# Patient Record
(demographics unavailable — no encounter records)

---

## 2024-12-17 NOTE — ASSESSMENT
[FreeTextEntry1] : Patient is post op L L5-S1 microdiscectomy with laminectomy 02/24/23. Overall, her leg pain and weakness had much improved. Now with R lower back pain and right leg pain. 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 No motor or sensory deficits on exam. Would continue PT for now and see pain management for possible MARTIN before committing to another surgery as well as more therapy.

## 2024-12-17 NOTE — PHYSICAL EXAM
[General Appearance - Alert] : alert [General Appearance - In No Acute Distress] : in no acute distress [Longitudinal] : longitudinal [Clean] : clean [Dry] : dry [Healing Well] : healing well [Well-Healed] : well-healed [Sutures Intact] : closed with intact sutures [No Drainage] : without drainage [Normal Skin] : normal [Oriented To Time, Place, And Person] : oriented to person, place, and time [Impaired Insight] : insight and judgment were intact [Affect] : the affect was normal [Normal] : normal [Intact] : all reflexes within normal limits bilaterally [Straight-Leg Raise Test - Left] : straight leg raise of the left leg was negative [Straight-Leg Raise Test - Right] : straight leg raise  of the right leg was negative

## 2024-12-17 NOTE — HISTORY OF PRESENT ILLNESS
[FreeTextEntry1] : 12/17/24: 35 y/o, female, post op L L5-S1 microdiscectomy with laminectomy 02/24/23. Low back and leg pain had resolved. She continues to have mild LLE weakness which was improving with therapy. Overall, she was happy with results. Here for 6 months follow up. She developed R lower back pain and right leg pain about 2 months ago. Her pain ranges from 3 to 5 out of 10 can go up to 8-9. No numbness, tingling but does have spasms. She is doing physical therapy once a week with some help. She takes Tylenol as needed.   05/02/23: Mrs. Alvarez is a 36 y/o, female, post op L L5-S1 microdiscectomy with laminectomy 02/24/23, here to f/u on xray. Xray stable with no instability. She continues to undergo PT for strengthening. Low back and leg pain has resolved. She continues to have mild LLE weakness which is improving with therapy. Overall she is happy with results.   03/07/23: Mrs. Alvarez is a 36 y/o, female, post op L L5-S1 microdiscectomy with laminectomy 02/24/23. Overall l her pain has much improved. She rates her low back pain a 5/10. She is c/o L leg numbness and tingling. Her weakness and leg pain has much improved. She has been taking methocarbamol for muscle spasms.   01/26/23: The patient is a 36 y/o, female, with a hx of of thyroidectomy on synthroid, here to establish care as a new pt due to acute low back pain. She reports x 1 month ago she was lying down and suddenly could not sit due severe low back pain. She denies any falls, or heavy lifting. She reports her low back pain radiates down posterior L leg to foot. She has associated numbness and tingling of L foot/ toes. She denies any urine incontinence, or vaginal numbness. She is currently attending PT at [facility]. She is not currently on any medications She has weakness of the left leg.She has diffiiculty going up staies with the left .